# Patient Record
Sex: FEMALE | Race: WHITE | NOT HISPANIC OR LATINO | URBAN - METROPOLITAN AREA
[De-identification: names, ages, dates, MRNs, and addresses within clinical notes are randomized per-mention and may not be internally consistent; named-entity substitution may affect disease eponyms.]

---

## 2024-01-01 ENCOUNTER — HOSPITAL ENCOUNTER (EMERGENCY)
Facility: HOSPITAL | Age: 24
Discharge: STILL ADMITTED OR ADMINISTRATIVE | End: 2024-01-03
Payer: MEDICAID

## 2024-01-01 ENCOUNTER — HOSPITAL ENCOUNTER (OUTPATIENT)
Facility: HOSPITAL | Age: 24
Setting detail: OBSERVATION
Discharge: HOME | End: 2024-01-01
Attending: OBSTETRICS & GYNECOLOGY | Admitting: OBSTETRICS & GYNECOLOGY
Payer: MEDICAID

## 2024-01-01 VITALS
BODY MASS INDEX: 25.76 KG/M2 | DIASTOLIC BLOOD PRESSURE: 71 MMHG | HEIGHT: 62 IN | RESPIRATION RATE: 18 BRPM | TEMPERATURE: 97.7 F | WEIGHT: 140 LBS | SYSTOLIC BLOOD PRESSURE: 107 MMHG | HEART RATE: 64 BPM | OXYGEN SATURATION: 99 %

## 2024-01-01 DIAGNOSIS — K52.9 GASTROENTERITIS: Primary | ICD-10-CM

## 2024-01-01 PROCEDURE — 96366 THER/PROPH/DIAG IV INF ADDON: CPT | Mod: 59

## 2024-01-01 PROCEDURE — 59025 FETAL NON-STRESS TEST: CPT | Performed by: OBSTETRICS & GYNECOLOGY

## 2024-01-01 PROCEDURE — G0378 HOSPITAL OBSERVATION PER HR: HCPCS

## 2024-01-01 PROCEDURE — 99213 OFFICE O/P EST LOW 20 MIN: CPT | Performed by: OBSTETRICS & GYNECOLOGY

## 2024-01-01 PROCEDURE — 94760 N-INVAS EAR/PLS OXIMETRY 1: CPT

## 2024-01-01 PROCEDURE — 96365 THER/PROPH/DIAG IV INF INIT: CPT

## 2024-01-01 PROCEDURE — 96375 TX/PRO/DX INJ NEW DRUG ADDON: CPT

## 2024-01-01 PROCEDURE — G0379 DIRECT REFER HOSPITAL OBSERV: HCPCS

## 2024-01-01 PROCEDURE — 96361 HYDRATE IV INFUSION ADD-ON: CPT | Mod: 59

## 2024-01-01 PROCEDURE — 4500999001 HC ED NO CHARGE

## 2024-01-01 PROCEDURE — 2500000004 HC RX 250 GENERAL PHARMACY W/ HCPCS (ALT 636 FOR OP/ED): Performed by: OBSTETRICS & GYNECOLOGY

## 2024-01-01 PROCEDURE — 2500000005 HC RX 250 GENERAL PHARMACY W/O HCPCS: Performed by: OBSTETRICS & GYNECOLOGY

## 2024-01-01 RX ORDER — SODIUM CHLORIDE, SODIUM LACTATE, POTASSIUM CHLORIDE, CALCIUM CHLORIDE 600; 310; 30; 20 MG/100ML; MG/100ML; MG/100ML; MG/100ML
300 INJECTION, SOLUTION INTRAVENOUS CONTINUOUS
Status: DISCONTINUED | OUTPATIENT
Start: 2024-01-01 | End: 2024-01-01 | Stop reason: HOSPADM

## 2024-01-01 RX ORDER — ONDANSETRON HYDROCHLORIDE 2 MG/ML
4 INJECTION, SOLUTION INTRAVENOUS EVERY 4 HOURS PRN
Status: DISCONTINUED | OUTPATIENT
Start: 2024-01-01 | End: 2024-01-01 | Stop reason: HOSPADM

## 2024-01-01 RX ORDER — ONDANSETRON 4 MG/1
4 TABLET, FILM COATED ORAL 2 TIMES DAILY PRN
Qty: 20 TABLET | Refills: 5 | Status: SHIPPED | OUTPATIENT
Start: 2024-01-01

## 2024-01-01 RX ADMIN — ONDANSETRON 4 MG: 2 INJECTION INTRAMUSCULAR; INTRAVENOUS at 13:15

## 2024-01-01 RX ADMIN — ASCORBIC ACID, VITAMIN A PALMITATE, CHOLECALCIFEROL, THIAMINE HYDROCHLORIDE, RIBOFLAVIN-5 PHOSPHATE SODIUM, PYRIDOXINE HYDROCHLORIDE, NIACINAMIDE, DEXPANTHENOL, ALPHA-TOCOPHEROL ACETATE, VITAMIN K1, FOLIC ACID, BIOTIN, CYANOCOBALAMIN: 200; 3300; 200; 6; 3.6; 6; 40; 15; 10; 150; 600; 60; 5 INJECTION, SOLUTION INTRAVENOUS at 14:02

## 2024-01-01 RX ADMIN — SODIUM CHLORIDE, POTASSIUM CHLORIDE, SODIUM LACTATE AND CALCIUM CHLORIDE 300 ML/HR: 600; 310; 30; 20 INJECTION, SOLUTION INTRAVENOUS at 12:35

## 2024-01-01 ASSESSMENT — COGNITIVE AND FUNCTIONAL STATUS - GENERAL
MOBILITY SCORE: 24
DAILY ACTIVITIY SCORE: 24

## 2024-01-01 ASSESSMENT — PAIN SCALES - GENERAL: PAINLEVEL_OUTOF10: 2

## 2024-01-01 ASSESSMENT — PAIN - FUNCTIONAL ASSESSMENT: PAIN_FUNCTIONAL_ASSESSMENT: 0-10

## 2024-01-01 NOTE — CARE PLAN
The patient's goals for the shift include less nausea     The clinical goals for the shift include  hydration; decreased nausea

## 2024-01-01 NOTE — DISCHARGE SUMMARY
Discharge Summary    Admission Date: 1/1/2024  Discharge Date: 1/1    Discharge Diagnosis  Gastroenteritis    Hospital Course  Delivery Date: This patient has no babies on file. This patient has no babies on file.  Delivery type: This patient has no babies on file.   GA at delivery: 31w5d  Outcome: This patient has no babies on file.  Anesthesia during delivery: This patient has no babies on file.  Intrapartum complications: This patient has no babies on file.  Feeding method:       Procedures: none  Contraception at discharge: none      Pertinent Physical Exam At Time of Discharge    Well  hydrated    Discharge Meds     Your medication list        START taking these medications        Instructions Last Dose Given Next Dose Due   ondansetron 4 mg tablet  Commonly known as: Zofran      Take 1 tablet (4 mg) by mouth 2 times a day as needed for nausea.                 Where to Get Your Medications        These medications were sent to [facility] [address] [place] [address] ([place]), [place] - [address] [facility]  [address] ([place]) [place] [address]      Phone: [phone]   ondansetron 4 mg tablet          Complications Requiring Follow-Up  Rv  office   this   week      Test Results Pending At Discharge  Pending Labs       No current pending labs.            Outpatient Follow-Up  No future appointments.    I spent 10 minutes in the professional and overall care of this patient.      Alexander Helton MD
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
## 2024-01-01 NOTE — PROGRESS NOTES
Feels   better  s/p   2  liters   ivf   and   wants   to  go   home   ,   will   give   zofran  and   follow  up   this week  with her   ob

## 2024-01-02 ENCOUNTER — HOSPITAL ENCOUNTER (OUTPATIENT)
Facility: HOSPITAL | Age: 24
Discharge: HOME | End: 2024-01-03
Attending: OBSTETRICS & GYNECOLOGY | Admitting: OBSTETRICS & GYNECOLOGY
Payer: MEDICAID

## 2024-01-02 DIAGNOSIS — R11.2 NAUSEA AND VOMITING, UNSPECIFIED VOMITING TYPE: Primary | ICD-10-CM

## 2024-01-02 LAB
ALBUMIN SERPL BCP-MCNC: 3.5 G/DL (ref 3.4–5)
ALP SERPL-CCNC: 93 U/L (ref 33–110)
ALT SERPL W P-5'-P-CCNC: 8 U/L (ref 7–45)
AMYLASE SERPL-CCNC: 38 U/L (ref 29–103)
ANION GAP SERPL CALC-SCNC: 19 MMOL/L (ref 10–20)
AST SERPL W P-5'-P-CCNC: 14 U/L (ref 9–39)
BILIRUB SERPL-MCNC: 1.4 MG/DL (ref 0–1.2)
BUN SERPL-MCNC: 5 MG/DL (ref 6–23)
CALCIUM SERPL-MCNC: 8.8 MG/DL (ref 8.6–10.3)
CHLORIDE SERPL-SCNC: 99 MMOL/L (ref 98–107)
CO2 SERPL-SCNC: 23 MMOL/L (ref 21–32)
CREAT SERPL-MCNC: 0.56 MG/DL (ref 0.5–1.05)
ERYTHROCYTE [DISTWIDTH] IN BLOOD BY AUTOMATED COUNT: 12.8 % (ref 11.5–14.5)
GFR SERPL CREATININE-BSD FRML MDRD: >90 ML/MIN/1.73M*2
GLUCOSE SERPL-MCNC: 95 MG/DL (ref 74–99)
HCT VFR BLD AUTO: 37.8 % (ref 36–46)
HGB BLD-MCNC: 12.7 G/DL (ref 12–16)
LIPASE SERPL-CCNC: 15 U/L (ref 9–82)
MCH RBC QN AUTO: 29.7 PG (ref 26–34)
MCHC RBC AUTO-ENTMCNC: 33.6 G/DL (ref 32–36)
MCV RBC AUTO: 88 FL (ref 80–100)
NRBC BLD-RTO: 0 /100 WBCS (ref 0–0)
PLATELET # BLD AUTO: 226 X10*3/UL (ref 150–450)
POTASSIUM SERPL-SCNC: 3 MMOL/L (ref 3.5–5.3)
PROT SERPL-MCNC: 6.3 G/DL (ref 6.4–8.2)
RBC # BLD AUTO: 4.28 X10*6/UL (ref 4–5.2)
SODIUM SERPL-SCNC: 138 MMOL/L (ref 136–145)
WBC # BLD AUTO: 14.7 X10*3/UL (ref 4.4–11.3)

## 2024-01-02 PROCEDURE — 2500000004 HC RX 250 GENERAL PHARMACY W/ HCPCS (ALT 636 FOR OP/ED): Performed by: OBSTETRICS & GYNECOLOGY

## 2024-01-02 PROCEDURE — 36415 COLL VENOUS BLD VENIPUNCTURE: CPT | Performed by: OBSTETRICS & GYNECOLOGY

## 2024-01-02 PROCEDURE — 82150 ASSAY OF AMYLASE: CPT | Performed by: OBSTETRICS & GYNECOLOGY

## 2024-01-02 PROCEDURE — 83690 ASSAY OF LIPASE: CPT | Performed by: OBSTETRICS & GYNECOLOGY

## 2024-01-02 PROCEDURE — 84075 ASSAY ALKALINE PHOSPHATASE: CPT | Performed by: OBSTETRICS & GYNECOLOGY

## 2024-01-02 PROCEDURE — G0378 HOSPITAL OBSERVATION PER HR: HCPCS

## 2024-01-02 PROCEDURE — 85027 COMPLETE CBC AUTOMATED: CPT | Performed by: OBSTETRICS & GYNECOLOGY

## 2024-01-02 PROCEDURE — G0379 DIRECT REFER HOSPITAL OBSERV: HCPCS

## 2024-01-02 RX ORDER — FAMOTIDINE 10 MG/ML
40 INJECTION INTRAVENOUS ONCE
Status: COMPLETED | OUTPATIENT
Start: 2024-01-02 | End: 2024-01-02

## 2024-01-02 RX ORDER — ONDANSETRON HYDROCHLORIDE 2 MG/ML
8 INJECTION, SOLUTION INTRAVENOUS ONCE
Status: COMPLETED | OUTPATIENT
Start: 2024-01-02 | End: 2024-01-02

## 2024-01-02 RX ORDER — DEXTROSE, SODIUM CHLORIDE, SODIUM LACTATE, POTASSIUM CHLORIDE, AND CALCIUM CHLORIDE 5; .6; .31; .03; .02 G/100ML; G/100ML; G/100ML; G/100ML; G/100ML
125 INJECTION, SOLUTION INTRAVENOUS CONTINUOUS
Status: DISCONTINUED | OUTPATIENT
Start: 2024-01-02 | End: 2024-01-03 | Stop reason: HOSPADM

## 2024-01-02 RX ADMIN — ONDANSETRON 8 MG: 2 INJECTION INTRAMUSCULAR; INTRAVENOUS at 23:19

## 2024-01-02 RX ADMIN — FAMOTIDINE 40 MG: 10 INJECTION, SOLUTION INTRAVENOUS at 23:26

## 2024-01-02 RX ADMIN — SODIUM CHLORIDE, SODIUM LACTATE, POTASSIUM CHLORIDE, CALCIUM CHLORIDE AND DEXTROSE MONOHYDRATE 125 ML/HR: 5; 600; 310; 30; 20 INJECTION, SOLUTION INTRAVENOUS at 23:16

## 2024-01-02 ASSESSMENT — PAIN SCALES - GENERAL: PAINLEVEL_OUTOF10: 2

## 2024-01-03 ENCOUNTER — HOSPITAL ENCOUNTER (OUTPATIENT)
Facility: HOSPITAL | Age: 24
Setting detail: OBSERVATION
End: 2024-01-03
Attending: OBSTETRICS & GYNECOLOGY | Admitting: OBSTETRICS & GYNECOLOGY
Payer: MEDICAID

## 2024-01-03 VITALS
TEMPERATURE: 99.1 F | RESPIRATION RATE: 16 BRPM | DIASTOLIC BLOOD PRESSURE: 62 MMHG | SYSTOLIC BLOOD PRESSURE: 104 MMHG | BODY MASS INDEX: 25.6 KG/M2 | HEART RATE: 73 BPM | HEIGHT: 62 IN

## 2024-01-03 PROBLEM — R11.2 NAUSEA AND VOMITING: Status: ACTIVE | Noted: 2024-01-03

## 2024-01-03 PROBLEM — O21.9 NAUSEA AND VOMITING IN PREGNANCY PRIOR TO 22 WEEKS GESTATION (HHS-HCC): Status: ACTIVE | Noted: 2024-01-03

## 2024-01-03 PROBLEM — E87.6 HYPOKALEMIA: Status: ACTIVE | Noted: 2024-01-03

## 2024-01-03 PROCEDURE — G0378 HOSPITAL OBSERVATION PER HR: HCPCS

## 2024-01-03 PROCEDURE — 2500000004 HC RX 250 GENERAL PHARMACY W/ HCPCS (ALT 636 FOR OP/ED): Performed by: OBSTETRICS & GYNECOLOGY

## 2024-01-03 PROCEDURE — 59020 FETAL CONTRACT STRESS TEST: CPT

## 2024-01-03 PROCEDURE — 99214 OFFICE O/P EST MOD 30 MIN: CPT | Performed by: OBSTETRICS & GYNECOLOGY

## 2024-01-03 RX ORDER — ONDANSETRON HYDROCHLORIDE 2 MG/ML
8 INJECTION, SOLUTION INTRAVENOUS ONCE
Status: COMPLETED | OUTPATIENT
Start: 2024-01-03 | End: 2024-01-03

## 2024-01-03 RX ORDER — PROMETHAZINE HYDROCHLORIDE 12.5 MG/1
12.5 SUPPOSITORY RECTAL EVERY 8 HOURS
Qty: 12 EACH | Refills: 1 | Status: SHIPPED | OUTPATIENT
Start: 2024-01-03

## 2024-01-03 RX ORDER — MAGNESIUM HYDROXIDE 2400 MG/10ML
10 SUSPENSION ORAL DAILY PRN
Status: DISCONTINUED | OUTPATIENT
Start: 2024-01-03 | End: 2024-01-03 | Stop reason: HOSPADM

## 2024-01-03 RX ORDER — POTASSIUM CHLORIDE 20 MEQ/1
20 TABLET, EXTENDED RELEASE ORAL ONCE
Status: COMPLETED | OUTPATIENT
Start: 2024-01-03 | End: 2024-01-03

## 2024-01-03 RX ORDER — ONDANSETRON 8 MG/1
8 TABLET, ORALLY DISINTEGRATING ORAL EVERY 8 HOURS PRN
Qty: 20 TABLET | Refills: 0 | Status: SHIPPED | OUTPATIENT
Start: 2024-01-03

## 2024-01-03 RX ADMIN — PROMETHAZINE HYDROCHLORIDE 12.5 MG: 25 INJECTION INTRAMUSCULAR; INTRAVENOUS at 02:50

## 2024-01-03 RX ADMIN — ONDANSETRON 8 MG: 2 INJECTION INTRAMUSCULAR; INTRAVENOUS at 05:25

## 2024-01-03 RX ADMIN — POTASSIUM CHLORIDE 20 MEQ: 1500 TABLET, EXTENDED RELEASE ORAL at 06:43

## 2024-01-03 RX ADMIN — SODIUM CHLORIDE, SODIUM LACTATE, POTASSIUM CHLORIDE, CALCIUM CHLORIDE AND DEXTROSE MONOHYDRATE 125 ML/HR: 5; 600; 310; 30; 20 INJECTION, SOLUTION INTRAVENOUS at 02:49

## 2024-01-03 NOTE — PROGRESS NOTES
Antepartum progress note    Assessment/Plan   Cassie Epstein is a 23 y.o.,  at 32w0d gestation readmitted with nausea and vomiting.  Discharge to home.  Rx for phenergan rectal suppositories and Zofran ODT sent to pharmacy.  Follow up with primary OB within next week.     Addendum:   Pts partner now here. Pt anxious about going home, constipated, having heartburn.  Will try oral dose of milk of magnesia for heartburn and constipation.   Pt to look at menu and see if something sounds palatable.   Hold discharge pending oral intake.    Subjective     Pt reports feeling better this am in the last hour. Has tried pretzels, sips of water. Was able to keep down potassium.   Wants to go home.     Objective     Recent /62    BP Min/Max Last 24 Hours:  BP  Min: 104/62  Max: 122/78    Physical Exam:  General: Examination reveals a well developed, well nourished, female, in no acute distress. She is alert and cooperative.  Abdomen: soft, non tender, non distended.    Lab Data:  Lab Results   Component Value Date    WBC 14.7 (H) 2024    HGB 12.7 2024    HCT 37.8 2024     2024     Lab Results   Component Value Date    GLUCOSE 95 2024     2024    K 3.0 (L) 2024    CL 99 2024    CO2 23 2024    ANIONGAP 19 2024    BUN 5 (L) 2024    CREATININE 0.56 2024    EGFR >90 2024    CALCIUM 8.8 2024    ALBUMIN 3.5 2024    PROT 6.3 (L) 2024    ALKPHOS 93 2024    ALT 8 2024    AST 14 2024    BILITOT 1.4 (H) 2024         Roopa Michael MD

## 2024-01-03 NOTE — DISCHARGE SUMMARY
Discharge Summary    Admission Date: 1/2/2024  Discharge Date: 1/4/24    Discharge Diagnosis  Nausea and vomiting    Hospital Course  Patient presented for intractable nausea/vomiting at 32 weeks. She was hypokalemic and had 2+ ketones on UAS. She received D5LR IVF, Zofran, Phenergan, and Potassium replacement with improvement. She was discharged home with plan for short interval follow up with regular OB and with Rx for Zofran ODT 8 mg and Phenergan rectal suppositories   Procedures: none  Contraception at discharge: to be discussed postpartum      Pertinent Physical Exam At Time of Discharge  GENERAL: Examination reveals a well developed, well nourished, gravid female in no acute distress. She is alert and cooperative.  LUNGS:  appropriate effort  ABDOMEN: soft, gravid, nontender, nondistended, no abnormal masses, no epigastric pain  FHR is 140 , with Accelerations, and a Cat I  tracing.    [place] reading:  irritability  EXTREMITIES: no redness or tenderness in the calves or thighs, no edema  PSYCHOLOGICAL: awake and alert; oriented to person, place, and time      Discharge Meds     Your medication list        START taking these medications        Instructions Last Dose Given Next Dose Due   ondansetron ODT 8 mg disintegrating tablet  Commonly known as: Zofran-ODT      Take 1 tablet (8 mg) by mouth every 8 hours if needed for nausea or vomiting.       promethazine 12.5 mg suppository  Commonly known as: Phenergan      Insert 1 suppository (12.5 mg) into the rectum every 8 hours.              ASK your doctor about these medications        Instructions Last Dose Given Next Dose Due   ondansetron 4 mg tablet  Commonly known as: Zofran      Take 1 tablet (4 mg) by mouth 2 times a day as needed for nausea.                 Where to Get Your Medications        These medications were sent to [facility] [address] [place] [address]), [place] - [address] [facility]  [address] [facility] ([place]) [place] [address]      Phone:  [phone]   ondansetron ODT 8 mg disintegrating tablet  promethazine 12.5 mg suppository          Complications Requiring Follow-Up  Nausea and vomiting    Test Results Pending At Discharge  Pending Labs       No current pending labs.            Outpatient Follow-Up  Return to regular OB provider within 1 week    I spent 45 minutes in the professional and overall care of this patient.      Zoya Olivarez, DO

## 2024-01-03 NOTE — H&P
Obstetrical Admission History and Physical     Cassie Epstein is a 23 y.o. . She presents for nausea and vomiting    Chief Complaint: Nausea/Vomiting In Pregnancy    Assessment/Plan    Discussed findings on exam  Patient received D5LR IVF resuscitation  Zofran and Phenergan given on L&D with improvement  Hypokalemic- potassium replaced prior to discharge  Rx for Zofran ODT 8 mg and Phenergan rectal suppositories sent to pharmacy  Patient overall improved, will discharge home with planned short interval follow up with regular OB provider    Principal Problem:    Nausea and vomiting  Active Problems:    Hypokalemia      Pregnancy Problems (from 23 to present)       Problem Noted Resolved    Nausea and vomiting 1/3/2024 by Zoya Olivarez DO No    Priority:  Medium      Overview Signed 1/3/2024  6:05 AM by Zoya Olivarez DO     Seen  and 1/3- for intractable nausea and vomiting  Received D5LR, Zofran, and Phenergan with improvement on 1/3  Plan for short interval follow up with regular OB  Precautions given, Rx for Zofran ODT 8 mg and Phenergan rectal suppositories sent to pharmacy               Danilo Matthews is here complaining of nausea and vomiting. She reports onset of acute severe nausea and vomiting after eating a sauce that tasted funny. She states this triggered a vicious cycle of nausea and vomiting that has not resolved. She was seen on L&D  and improved temporarily with IV antiemetics and IVF. She was given a prescription for Zofran but was unable to keep it down when her nausea recurred after discharge. She states she cannot keep down food or water and continues to vomit even when she does not PO challenge. Good fetal movement. Denies vaginal bleeding., Denies contractions., Denies leaking of fluid.           Obstetrical History   OB History    Para Term  AB Living   3 2 2     2   SAB IAB Ectopic Multiple Live Births           2      # Outcome Date GA Lbr  Bradley/2nd Weight Sex Delivery Anes PTL Lv   3 Current            2 Term 02/26/21    F Vag-Spont   [place]   1 Term 11/02/18    F Vag-Spont   [place]ON       Past Medical History  Past Medical History:   Diagnosis Date    Previous back surgery     pins rods in back    Unspecified fracture of unspecified lumbar vertebra, initial encounter for closed fracture (CMS/[facility]) 06/25/2019    Fracture of lumbar spine        Past Surgical History   Past Surgical History:   Procedure Laterality Date    OTHER SURGICAL HISTORY  05/09/2019    Spinal surgery       Social History  Social History     Tobacco Use    Smoking status: Never    Smokeless tobacco: Never   Substance Use Topics    Alcohol use: Not Currently     Substance and Sexual Activity   Drug Use Never       Allergies  Patient has no known allergies.     Medications  Medications Prior to Admission   Medication Sig Dispense Refill Last Dose    ondansetron (Zofran) 4 mg tablet Take 1 tablet (4 mg) by mouth 2 times a day as needed for nausea. 20 tablet 5        Objective    Last Vitals  Temp Pulse Resp BP MAP O2 Sat   36.8 °C (98.2 °F) 65 18 122/78         Physical Examination  GENERAL: Examination reveals a well developed, well nourished, gravid female in no acute distress. She is alert and cooperative.  LUNGS:  appropriate effort  HEART: regular rate and rhythm, S1, S2 normal, no murmur, click, rub or gallop  ABDOMEN: soft, gravid, nontender, nondistended, no abnormal masses, no epigastric pain  FHR is 140 , with Accelerations, and a Cat I  tracing.    [place] reading:  Irritability  EXTREMITIES: no redness or tenderness in the calves or thighs, no edema  PSYCHOLOGICAL: awake and alert; oriented to person, place, and time    Lab Review  Lab Results   Component Value Date    WBC 14.7 (H) 01/02/2024    HGB 12.7 01/02/2024    HCT 37.8 01/02/2024     01/02/2024     Lab Results   Component Value Date    GLUCOSE 95 01/02/2024     01/02/2024    K 3.0 (L) 01/02/2024    CL 99  01/02/2024    CO2 23 01/02/2024    ANIONGAP 19 01/02/2024    BUN 5 (L) 01/02/2024    CREATININE 0.56 01/02/2024    EGFR >90 01/02/2024    CALCIUM 8.8 01/02/2024    ALBUMIN 3.5 01/02/2024    PROT 6.3 (L) 01/02/2024    ALKPHOS 93 01/02/2024    ALT 8 01/02/2024    AST 14 01/02/2024    BILITOT 1.4 (H) 01/02/2024     Zoya Olivarez, DO

## 2024-01-03 NOTE — DISCHARGE INSTRUCTIONS
OB Triage Discharge    Return to your regular OB provider's office within 1 week of discharge for follow up    Please keep all upcoming scheduled appointments  If you do not have a scheduled appointment for routine care, please call the office to schedule one in the next 1-2 weeks    Call the office immediately for any of the following symptoms:  Painful contractions, regular contractions that are worsening over time, or any new/persistent pelvic or abdominal pain  Leaking amniotic fluid  Vaginal bleeding  Decreased or absent fetal movement

## 2024-01-03 NOTE — NURSING NOTE
0020 pt resting, n/v slowed per pt. IVF still running as ordered. Pt encouraged to rest. Pt states she feels like the medications are helping.

## 2024-01-03 NOTE — NURSING NOTE
"Pt arrived from ED c/o N/V . +FM, -contx, -LOF, -VB, prenatal care in [place]. Was seen here yesterday on the floor for \"same thing. Felt better after fluids but began again this evening.\" EFM placed.   "

## 2024-01-08 ENCOUNTER — HOSPITAL ENCOUNTER (OUTPATIENT)
Age: 24
Discharge: HOME | End: 2024-01-08

## 2024-01-24 ENCOUNTER — HOSPITAL ENCOUNTER (OUTPATIENT)
Age: 24
Discharge: HOME | End: 2024-01-24
Payer: MEDICAID

## 2024-01-24 LAB
ERYTHROCYTE [DISTWIDTH] IN BLOOD BY AUTOMATED COUNT: 13.1 % (ref 11.5–15)
HCT VFR BLD AUTO: 37 % (ref 34–48)
HGB BLD-MCNC: 12 G/DL (ref 11.5–15.5)
MCH RBC QN AUTO: 29.1 PG (ref 26–35)
MCHC RBC AUTO-ENTMCNC: 32.4 G/DL (ref 32–34.5)
MCV RBC AUTO: 89.6 FL (ref 80–99.9)
PLATELET # BLD AUTO: 185 K/UL (ref 130–450)
PMV BLD AUTO: 10.9 FL (ref 7–12)
RBC # BLD AUTO: 4.13 M/UL (ref 3.5–5.5)
WBC OTHER # BLD: 8.6 K/UL (ref 4.5–11.5)

## 2024-01-24 PROCEDURE — 96372 THER/PROPH/DIAG INJ SC/IM: CPT

## 2024-01-24 PROCEDURE — 86762 RUBELLA ANTIBODY: CPT

## 2024-01-24 PROCEDURE — 86592 SYPHILIS TEST NON-TREP QUAL: CPT

## 2024-01-24 PROCEDURE — 6360000002 HC RX W HCPCS: Performed by: OBSTETRICS & GYNECOLOGY

## 2024-01-24 PROCEDURE — 86850 RBC ANTIBODY SCREEN: CPT

## 2024-01-24 PROCEDURE — 86900 BLOOD TYPING SEROLOGIC ABO: CPT

## 2024-01-24 PROCEDURE — 86901 BLOOD TYPING SEROLOGIC RH(D): CPT

## 2024-01-24 PROCEDURE — 85027 COMPLETE CBC AUTOMATED: CPT

## 2024-01-24 PROCEDURE — 87340 HEPATITIS B SURFACE AG IA: CPT

## 2024-01-24 PROCEDURE — 36415 COLL VENOUS BLD VENIPUNCTURE: CPT

## 2024-01-24 RX ADMIN — HUMAN RHO(D) IMMUNE GLOBULIN 300 MCG: 1500 SOLUTION INTRAMUSCULAR; INTRAVENOUS at 16:30

## 2024-01-25 LAB
HBV SURFACE AG SERPL QL IA: NONREACTIVE
RPR SER QL: NONREACTIVE
RUBV IGG SERPL QL IA: NORMAL IU/ML

## 2024-01-26 LAB
ABO + RH BLD: NORMAL
BLOOD GROUP ANTIBODIES SERPL: NEGATIVE
COMPONENT: NORMAL
COMPONENT: NORMAL
HISTORY CHECK: NORMAL
Lab: 1
STATUS OF UNITS: NORMAL
TRANSFUSION STATUS: NORMAL
UNIT DIVISION: 0
UNIT NUMBER: NORMAL

## 2024-01-28 ENCOUNTER — HOSPITAL ENCOUNTER (OUTPATIENT)
Age: 24
Discharge: HOME | End: 2024-01-28
Attending: OBSTETRICS & GYNECOLOGY | Admitting: OBSTETRICS & GYNECOLOGY
Payer: MEDICAID

## 2024-01-28 VITALS — RESPIRATION RATE: 16 BRPM | DIASTOLIC BLOOD PRESSURE: 58 MMHG | HEART RATE: 92 BPM | SYSTOLIC BLOOD PRESSURE: 117 MMHG

## 2024-01-28 PROBLEM — O26.90 PREGNANCY RELATED CONDITION, ANTEPARTUM: Status: ACTIVE | Noted: 2024-01-28

## 2024-01-28 PROBLEM — O42.90 LEAKAGE OF AMNIOTIC FLUID: Status: ACTIVE | Noted: 2024-01-28

## 2024-01-28 LAB
AMNISURE, POC: NEGATIVE
BACTERIA URNS QL MICRO: ABNORMAL
BILIRUB UR QL STRIP: NEGATIVE
CLARITY UR: CLEAR
COLOR UR: YELLOW
GLUCOSE UR STRIP-MCNC: NEGATIVE MG/DL
HGB UR QL STRIP.AUTO: NEGATIVE
KETONES UR STRIP-MCNC: NEGATIVE MG/DL
LEUKOCYTE ESTERASE UR QL STRIP: NEGATIVE
Lab: NORMAL
NEGATIVE QC PASS/FAIL: NORMAL
NITRITE UR QL STRIP: NEGATIVE
PH UR STRIP: 7 [PH] (ref 5–9)
POSITIVE QC PASS/FAIL: NORMAL
PROT UR STRIP-MCNC: NEGATIVE MG/DL
RBC #/AREA URNS HPF: ABNORMAL /HPF
SP GR UR STRIP: 1.01 (ref 1–1.03)
UROBILINOGEN UR STRIP-ACNC: 0.2 EU/DL (ref 0–1)
WBC #/AREA URNS HPF: ABNORMAL /HPF

## 2024-01-28 PROCEDURE — 81001 URINALYSIS AUTO W/SCOPE: CPT

## 2024-01-28 PROCEDURE — 87081 CULTURE SCREEN ONLY: CPT

## 2024-01-28 PROCEDURE — 86403 PARTICLE AGGLUT ANTBDY SCRN: CPT

## 2024-01-28 PROCEDURE — 84112 EVAL AMNIOTIC FLUID PROTEIN: CPT

## 2024-01-28 PROCEDURE — 99202 OFFICE O/P NEW SF 15 MIN: CPT

## 2024-01-29 NOTE — PROGRESS NOTES
Dr nance notified of patient arrival and complaint. Notified of reactive . Rare uterine contractions on the monitor. Amnisure negative. SVE 1/ thick and posterior

## 2024-01-29 NOTE — PROGRESS NOTES
35.4 weeks arrived ambulatory to L&D complaining of contractions and leaking of fluid. Denies vaginal bleeding. Perceived fetal movement by patient. Oriented to triage 1

## 2024-01-29 NOTE — DISCHARGE INSTRUCTIONS
Home Undelivered Discharge Instructions    After Discharge Orders:    Keep next scheduled appointment                    Diet:  normal diet as tolerated    Rest: normal activity as tolerated    Other instructions: Do kick counts once a day on your baby. Choose the time of day your baby is most active. Get in a comfortable lying or sitting position and time how long it takes to feel 10 kicks, twists, turns, swishes, or rolls. Call your physician or midwife if there have not been 10 kicks in 1.5 hours    Call physician or midwife, return to Labor and Delivery, call 911, or go to the nearest Emergency Room if: increased leakage or fluid, contractions more than  10 per  1.5 hours, decreased fetal movement, or bleeding from vaginal area

## 2024-01-31 LAB
MICROORGANISM SPEC CULT: ABNORMAL
SPECIMEN DESCRIPTION: ABNORMAL

## 2024-02-16 ENCOUNTER — HOSPITAL ENCOUNTER (INPATIENT)
Age: 24
LOS: 2 days | Discharge: HOME | DRG: 560 | End: 2024-02-18
Attending: OBSTETRICS & GYNECOLOGY | Admitting: OBSTETRICS & GYNECOLOGY
Payer: MEDICAID

## 2024-02-16 PROBLEM — Z3A.38 38 WEEKS GESTATION OF PREGNANCY: Status: ACTIVE | Noted: 2024-02-16

## 2024-02-16 PROBLEM — Z78.9 ADMITTED TO LABOR AND DELIVERY: Status: RESOLVED | Noted: 2024-02-16 | Resolved: 2024-02-16

## 2024-02-16 PROBLEM — O26.90 PREGNANCY RELATED CONDITION, ANTEPARTUM: Status: RESOLVED | Noted: 2024-01-28 | Resolved: 2024-02-16

## 2024-02-16 PROBLEM — Z78.9 ADMITTED TO LABOR AND DELIVERY: Status: ACTIVE | Noted: 2024-02-16

## 2024-02-16 LAB
ABO + RH BLD: NORMAL
ALBUMIN SERPL-MCNC: 3.2 G/DL (ref 3.5–5.2)
ALP SERPL-CCNC: 172 U/L (ref 35–104)
ALT SERPL-CCNC: 8 U/L (ref 0–32)
AMPHET UR QL SCN: NEGATIVE
ANION GAP SERPL CALCULATED.3IONS-SCNC: 13 MMOL/L (ref 7–16)
ANTIBODY IDENTIFICATION: NORMAL
ARM BAND NUMBER: NORMAL
AST SERPL-CCNC: 15 U/L (ref 0–31)
BARBITURATES UR QL SCN: NEGATIVE
BENZODIAZ UR QL: NEGATIVE
BILIRUB SERPL-MCNC: 0.6 MG/DL (ref 0–1.2)
BLOOD BANK SAMPLE EXPIRATION: NORMAL
BLOOD GROUP ANTIBODIES SERPL: POSITIVE
BUN SERPL-MCNC: 4 MG/DL (ref 6–20)
BUPRENORPHINE UR QL: NEGATIVE
CALCIUM SERPL-MCNC: 8.7 MG/DL (ref 8.6–10.2)
CANNABINOIDS UR QL SCN: POSITIVE
CHLORIDE SERPL-SCNC: 105 MMOL/L (ref 98–107)
CO2 SERPL-SCNC: 19 MMOL/L (ref 22–29)
COCAINE UR QL SCN: NEGATIVE
CREAT SERPL-MCNC: 0.6 MG/DL (ref 0.5–1)
DAT POLY-SP REAG RBC QL: NEGATIVE
ERYTHROCYTE [DISTWIDTH] IN BLOOD BY AUTOMATED COUNT: 13.3 % (ref 11.5–15)
FENTANYL UR QL: NEGATIVE
GFR SERPL CREATININE-BSD FRML MDRD: >60 ML/MIN/1.73M2
GLUCOSE SERPL-MCNC: 104 MG/DL (ref 74–99)
HCT VFR BLD AUTO: 35.9 % (ref 34–48)
HGB BLD-MCNC: 11.9 G/DL (ref 11.5–15.5)
HIV1+2 AB SERPLBLD QL IA.RAPID: NONREACTIVE
MCH RBC QN AUTO: 28.9 PG (ref 26–35)
MCHC RBC AUTO-ENTMCNC: 33.1 G/DL (ref 32–34.5)
MCV RBC AUTO: 87.1 FL (ref 80–99.9)
METHADONE UR QL: NEGATIVE
OPIATES UR QL SCN: NEGATIVE
OXYCODONE UR QL SCN: NEGATIVE
PCP UR QL SCN: NEGATIVE
PLATELET # BLD AUTO: 200 K/UL (ref 130–450)
PMV BLD AUTO: 12.6 FL (ref 7–12)
POTASSIUM SERPL-SCNC: 3.6 MMOL/L (ref 3.5–5)
PROT SERPL-MCNC: 5.9 G/DL (ref 6.4–8.3)
RBC # BLD AUTO: 4.12 M/UL (ref 3.5–5.5)
SODIUM SERPL-SCNC: 137 MMOL/L (ref 132–146)
TEST INFORMATION: ABNORMAL
WBC OTHER # BLD: 9.6 K/UL (ref 4.5–11.5)

## 2024-02-16 PROCEDURE — 86592 SYPHILIS TEST NON-TREP QUAL: CPT

## 2024-02-16 PROCEDURE — 86870 RBC ANTIBODY IDENTIFICATION: CPT

## 2024-02-16 PROCEDURE — 86850 RBC ANTIBODY SCREEN: CPT

## 2024-02-16 PROCEDURE — 10907ZC DRAINAGE OF AMNIOTIC FLUID, THERAPEUTIC FROM PRODUCTS OF CONCEPTION, VIA NATURAL OR ARTIFICIAL OPENING: ICD-10-PCS | Performed by: OBSTETRICS & GYNECOLOGY

## 2024-02-16 PROCEDURE — 2580000003 HC RX 258: Performed by: OBSTETRICS & GYNECOLOGY

## 2024-02-16 PROCEDURE — 6360000002 HC RX W HCPCS: Performed by: OBSTETRICS & GYNECOLOGY

## 2024-02-16 PROCEDURE — 59025 FETAL NON-STRESS TEST: CPT

## 2024-02-16 PROCEDURE — 1710000000 HC NURSERY LEVEL I R&B

## 2024-02-16 PROCEDURE — G0480 DRUG TEST DEF 1-7 CLASSES: HCPCS

## 2024-02-16 PROCEDURE — 85027 COMPLETE CBC AUTOMATED: CPT

## 2024-02-16 PROCEDURE — 86901 BLOOD TYPING SEROLOGIC RH(D): CPT

## 2024-02-16 PROCEDURE — 86900 BLOOD TYPING SEROLOGIC ABO: CPT

## 2024-02-16 PROCEDURE — 80053 COMPREHEN METABOLIC PANEL: CPT

## 2024-02-16 PROCEDURE — 80307 DRUG TEST PRSMV CHEM ANLYZR: CPT

## 2024-02-16 PROCEDURE — 86880 COOMBS TEST DIRECT: CPT

## 2024-02-16 PROCEDURE — 86701 HIV-1ANTIBODY: CPT

## 2024-02-16 PROCEDURE — 6370000000 HC RX 637 (ALT 250 FOR IP): Performed by: OBSTETRICS & GYNECOLOGY

## 2024-02-16 RX ORDER — MODIFIED LANOLIN
OINTMENT (GRAM) TOPICAL PRN
Status: DISCONTINUED | OUTPATIENT
Start: 2024-02-16 | End: 2024-02-18 | Stop reason: HOSPADM

## 2024-02-16 RX ORDER — ONDANSETRON 4 MG/1
8 TABLET, ORALLY DISINTEGRATING ORAL EVERY 8 HOURS PRN
Status: DISCONTINUED | OUTPATIENT
Start: 2024-02-16 | End: 2024-02-18 | Stop reason: HOSPADM

## 2024-02-16 RX ORDER — SODIUM CHLORIDE 0.9 % (FLUSH) 0.9 %
5-40 SYRINGE (ML) INJECTION EVERY 12 HOURS SCHEDULED
Status: DISCONTINUED | OUTPATIENT
Start: 2024-02-16 | End: 2024-02-16

## 2024-02-16 RX ORDER — DOCUSATE SODIUM 100 MG/1
100 CAPSULE, LIQUID FILLED ORAL 2 TIMES DAILY
Status: DISCONTINUED | OUTPATIENT
Start: 2024-02-16 | End: 2024-02-16

## 2024-02-16 RX ORDER — LIDOCAINE HYDROCHLORIDE 10 MG/ML
INJECTION, SOLUTION INFILTRATION; PERINEURAL
Status: DISCONTINUED
Start: 2024-02-16 | End: 2024-02-16

## 2024-02-16 RX ORDER — SODIUM CHLORIDE 0.9 % (FLUSH) 0.9 %
5-40 SYRINGE (ML) INJECTION PRN
Status: DISCONTINUED | OUTPATIENT
Start: 2024-02-16 | End: 2024-02-16

## 2024-02-16 RX ORDER — ONDANSETRON 2 MG/ML
4 INJECTION INTRAMUSCULAR; INTRAVENOUS EVERY 6 HOURS PRN
Status: DISCONTINUED | OUTPATIENT
Start: 2024-02-16 | End: 2024-02-16

## 2024-02-16 RX ORDER — SODIUM CHLORIDE 0.9 % (FLUSH) 0.9 %
5-40 SYRINGE (ML) INJECTION EVERY 12 HOURS SCHEDULED
Status: DISCONTINUED | OUTPATIENT
Start: 2024-02-16 | End: 2024-02-18 | Stop reason: HOSPADM

## 2024-02-16 RX ORDER — SODIUM CHLORIDE 9 MG/ML
INJECTION, SOLUTION INTRAVENOUS PRN
Status: DISCONTINUED | OUTPATIENT
Start: 2024-02-16 | End: 2024-02-18 | Stop reason: HOSPADM

## 2024-02-16 RX ORDER — SODIUM CHLORIDE, SODIUM LACTATE, POTASSIUM CHLORIDE, CALCIUM CHLORIDE 600; 310; 30; 20 MG/100ML; MG/100ML; MG/100ML; MG/100ML
INJECTION, SOLUTION INTRAVENOUS CONTINUOUS
Status: DISCONTINUED | OUTPATIENT
Start: 2024-02-16 | End: 2024-02-16

## 2024-02-16 RX ORDER — SODIUM CHLORIDE 0.9 % (FLUSH) 0.9 %
5-40 SYRINGE (ML) INJECTION PRN
Status: DISCONTINUED | OUTPATIENT
Start: 2024-02-16 | End: 2024-02-18 | Stop reason: HOSPADM

## 2024-02-16 RX ORDER — CARBOPROST TROMETHAMINE 250 UG/ML
250 INJECTION, SOLUTION INTRAMUSCULAR PRN
Status: DISCONTINUED | OUTPATIENT
Start: 2024-02-16 | End: 2024-02-16

## 2024-02-16 RX ORDER — SODIUM CHLORIDE, SODIUM LACTATE, POTASSIUM CHLORIDE, AND CALCIUM CHLORIDE .6; .31; .03; .02 G/100ML; G/100ML; G/100ML; G/100ML
1000 INJECTION, SOLUTION INTRAVENOUS PRN
Status: DISCONTINUED | OUTPATIENT
Start: 2024-02-16 | End: 2024-02-16

## 2024-02-16 RX ORDER — DOCUSATE SODIUM 100 MG/1
100 CAPSULE, LIQUID FILLED ORAL 2 TIMES DAILY
Status: DISCONTINUED | OUTPATIENT
Start: 2024-02-16 | End: 2024-02-18 | Stop reason: HOSPADM

## 2024-02-16 RX ORDER — LIDOCAINE HYDROCHLORIDE 10 MG/ML
0.8 INJECTION, SOLUTION EPIDURAL; INFILTRATION; INTRACAUDAL; PERINEURAL ONCE
Status: DISCONTINUED | OUTPATIENT
Start: 2024-02-16 | End: 2024-02-16

## 2024-02-16 RX ORDER — IBUPROFEN 800 MG/1
800 TABLET ORAL EVERY 8 HOURS PRN
Status: DISCONTINUED | OUTPATIENT
Start: 2024-02-16 | End: 2024-02-18 | Stop reason: HOSPADM

## 2024-02-16 RX ORDER — MISOPROSTOL 200 UG/1
800 TABLET ORAL PRN
Status: DISCONTINUED | OUTPATIENT
Start: 2024-02-16 | End: 2024-02-16

## 2024-02-16 RX ORDER — METHYLERGONOVINE MALEATE 0.2 MG/ML
200 INJECTION INTRAVENOUS PRN
Status: DISCONTINUED | OUTPATIENT
Start: 2024-02-16 | End: 2024-02-16

## 2024-02-16 RX ORDER — PETROLATUM,WHITE/LANOLIN
OINTMENT (GRAM) TOPICAL PRN
Status: DISCONTINUED | OUTPATIENT
Start: 2024-02-17 | End: 2024-02-16

## 2024-02-16 RX ORDER — FERROUS SULFATE 325(65) MG
325 TABLET ORAL 2 TIMES DAILY WITH MEALS
Status: DISCONTINUED | OUTPATIENT
Start: 2024-02-17 | End: 2024-02-18 | Stop reason: HOSPADM

## 2024-02-16 RX ORDER — SODIUM CHLORIDE, SODIUM LACTATE, POTASSIUM CHLORIDE, AND CALCIUM CHLORIDE .6; .31; .03; .02 G/100ML; G/100ML; G/100ML; G/100ML
500 INJECTION, SOLUTION INTRAVENOUS PRN
Status: DISCONTINUED | OUTPATIENT
Start: 2024-02-16 | End: 2024-02-16

## 2024-02-16 RX ORDER — ACETAMINOPHEN 500 MG
1000 TABLET ORAL EVERY 8 HOURS PRN
Status: DISCONTINUED | OUTPATIENT
Start: 2024-02-16 | End: 2024-02-18 | Stop reason: HOSPADM

## 2024-02-16 RX ORDER — SODIUM CHLORIDE 9 MG/ML
25 INJECTION, SOLUTION INTRAVENOUS PRN
Status: DISCONTINUED | OUTPATIENT
Start: 2024-02-16 | End: 2024-02-16

## 2024-02-16 RX ADMIN — Medication 909 MILLI-UNITS/MIN: at 16:55

## 2024-02-16 RX ADMIN — SODIUM CHLORIDE, POTASSIUM CHLORIDE, SODIUM LACTATE AND CALCIUM CHLORIDE: 600; 310; 30; 20 INJECTION, SOLUTION INTRAVENOUS at 10:55

## 2024-02-16 RX ADMIN — ONDANSETRON 4 MG: 2 INJECTION INTRAMUSCULAR; INTRAVENOUS at 16:23

## 2024-02-16 RX ADMIN — DOCUSATE SODIUM 100 MG: 100 CAPSULE, LIQUID FILLED ORAL at 21:13

## 2024-02-16 RX ADMIN — Medication 87.3 MILLI-UNITS/MIN: at 17:06

## 2024-02-16 ASSESSMENT — PAIN SCALES - GENERAL: PAINLEVEL_OUTOF10: 0

## 2024-02-16 NOTE — PROGRESS NOTES
38W 2D   EDC 2024  Ambulatory in with verbal c/o contractions every 6 minutes and light vaginal bleeding. Patient states she does not want induction of labor with medication and refuses an epidural. Patient does not want infant to receive Hepatitis B vaccine or vitamin K, this RN provided her with education and gave paperwork for further education.   Meternal perception of fetal movement noted. Pt denies leaking of fluids. Call light within reach, ice water provided to pt.     1011 This RN called and update Dr Farnsworth with above information, FHM, contractions, vitals and SVE

## 2024-02-16 NOTE — PROGRESS NOTES
Dr Farnsworth at bedside SVE check and M assessed.   This RN update Dr Farnsworth on Abnormal GBS on 1- and on 2-7-2024 GBS not detected lab. This RN wanted to clarify no treatment for abnormal GBS. Dr stated no treatment/ no new orders

## 2024-02-16 NOTE — L&D DELIVERY NOTE
Department of Obstetrics and Gynecology  Spontaneous Vaginal Delivery Note         Pre-operative Diagnosis:  Term pregnancy, Spontaneous labor, Single fetus, and Uncomplicated pregnancy    Post-operative Diagnosis:  Same + live male wt 6 #,11 oz  Procedure:  Spontaneous vaginal delivery    Surgeon:  Hugh Farnsworth MD    Anesthesia:  none    Estimated blood loss:  200    Specimen:  Placenta sent to pathology     Cord blood sent Yes    Complications:  none    Condition:  infant stable to general nursery and mother stable    Details of Procedure:   The patient is a 23 y.o. female at 38w2d   OB History          3    Para   2    Term   2            AB        Living   1         SAB        IAB        Ectopic        Molar        Multiple        Live Births   1             who was admitted for active phase labor. She received the following interventions: none She was known to be GBS negative and did not receive antibiotic prophylaxis. The patient progressed well,did not receive an epidural, became complete and started to push. After pushing for 3 x the fetal head was at the perineum, nose and mouth suctioned with bulb suction and the rest of the infant delivered atraumatically, placed on mother abdomen.Cord was clamped and cut and infant handed off to the waiting nurse for evaluation. The delivery of the placenta was spontaneous. The perineum and vagina were explored and no tears or lacerations were noted.

## 2024-02-16 NOTE — CARE COORDINATION
2024: SS NOTE:  SS Consult noted  regarding \"+ UDS\", per chart review, pt admitted for IOL, UDS positive for Cannabinoid on admission, sw will follow with pt after delivery to complete ANDRES- mandated  guide for plan of safe care assessment and for UDS results on  to review with [facility] as per protocol, Nursing informed.Electronically signed by TONIO Angeles on 2024 at 1:46 PM

## 2024-02-16 NOTE — H&P
Subjective:      Cassia Keene is an 23 y.o. female at 38 and 2/7 weeks gestation  complaining of contractions every 3 minutes lasting 30 seconds. Other associated symptoms include low back pain. Fetal Movement: normal.  Patient's medications, allergies, past medical, surgical, social and family histories were reviewed and updated as appropriate.  Social History:  TOBACCO:   reports that she has never smoked. She has never used smokeless tobacco.  ETOH:   has no history on file for alcohol use.  DRUGS:   has no history on file for drug use.    Family History:   No family history on file.    meds:  Current Facility-Administered Medications:     lactated ringers IV soln infusion, , IntraVENous, Continuous, Hugh Farnsworth MD, Last Rate: 125 mL/hr at 02/16/24 1055, New Bag at 02/16/24 1055    lactated ringers bolus bolus 500 mL, 500 mL, IntraVENous, PRN **OR** lactated ringers bolus bolus 1,000 mL, 1,000 mL, IntraVENous, PRN, Hugh Farnsworth MD    sodium chloride flush 0.9 % injection 5-40 mL, 5-40 mL, IntraVENous, 2 times per day, Hugh Farnsworth MD    sodium chloride flush 0.9 % injection 5-40 mL, 5-40 mL, IntraVENous, PRN, Hugh Farnsworth MD    0.9 % sodium chloride infusion, 25 mL, IntraVENous, PRN, Hugh Farnsworth MD    methylergonovine (METHERGINE) injection 200 mcg, 200 mcg, IntraMUSCular, PRN, Hugh Farnsworth MD    carboprost (HEMABATE) injection 250 mcg, 250 mcg, IntraMUSCular, PRN, Hugh Farnsworth MD    miSOPROStol (CYTOTEC) tablet 800 mcg, 800 mcg, Rectal, PRN, Hugh Farnsworth MD    tranexamic acid (CYKLOKAPRON) 1,000 mg in sodium chloride 0.9 % 100 mL IVPB, 1,000 mg, IntraVENous, Once PRN, Hugh Farnsworth MD    oxytocin (PITOCIN) 30 units in 500 mL infusion, 87.3 talya-units/min, IntraVENous, Continuous PRN **AND** oxytocin (PITOCIN) 30 units in 500 mL infusion, 909 talya-units/min, IntraVENous, PRN, Hugh Farnsworth MD    ondansetron (ZOFRAN) injection 4 mg, 4 mg,

## 2024-02-17 PROBLEM — O26.90 COMPLICATED PREGNANCY: Status: ACTIVE | Noted: 2024-02-17

## 2024-02-17 LAB
HCT VFR BLD AUTO: 35.5 % (ref 34–48)
HGB BLD-MCNC: 11.4 G/DL (ref 11.5–15.5)

## 2024-02-17 PROCEDURE — 1220000001 HC SEMI PRIVATE L&D R&B

## 2024-02-17 PROCEDURE — 85014 HEMATOCRIT: CPT

## 2024-02-17 PROCEDURE — 6370000000 HC RX 637 (ALT 250 FOR IP): Performed by: OBSTETRICS & GYNECOLOGY

## 2024-02-17 PROCEDURE — 85018 HEMOGLOBIN: CPT

## 2024-02-17 RX ORDER — IBUPROFEN 800 MG/1
800 TABLET ORAL EVERY 8 HOURS PRN
Qty: 120 TABLET | Refills: 0 | Status: SHIPPED | OUTPATIENT
Start: 2024-02-17

## 2024-02-17 RX ADMIN — DOCUSATE SODIUM 100 MG: 100 CAPSULE, LIQUID FILLED ORAL at 09:01

## 2024-02-17 RX ADMIN — WITCH HAZEL: 500 SOLUTION RECTAL; TOPICAL at 08:47

## 2024-02-17 RX ADMIN — Medication: at 08:47

## 2024-02-17 RX ADMIN — DOCUSATE SODIUM 100 MG: 100 CAPSULE, LIQUID FILLED ORAL at 20:33

## 2024-02-17 ASSESSMENT — PAIN SCALES - GENERAL
PAINLEVEL_OUTOF10: 0

## 2024-02-17 NOTE — PROGRESS NOTES
[employer] [place] Hearing screening results were discussed with parent. Questions answered. Brochure given to parent. Advised to monitor developmental milestones and contact physician for any concerns.   Nestor Herbert, AuD

## 2024-02-17 NOTE — DISCHARGE INSTRUCTIONS
Follow up with Dr Farnsworth in the office in 6 weeks.     No sexual intercourse, tampon use, douching, tub baths, or swimming for 6 weeks until the healing process has completed and you have been cleared by Dr Farnsworth     No heavy lifting or strenuous activity for 6 weeks.     No driving for 2 weeks.       After Your Delivery (the Postpartum Period): Care Instructions  Overview    Congratulations on the birth of your baby. Like pregnancy, the  period can be a time of excitement, corina, and exhaustion. You may look at your wondrous little baby and feel happy. You may also be overwhelmed by your new sleep hours and new responsibilities.  At first, babies often sleep during the days and are awake at night. They do not have a pattern or routine. They may make sudden gasps, jerk themselves awake, or look like they have crossed eyes. These are all normal, and they may even make you smile.  In these first weeks after delivery, try to take good care of yourself. It may take 4 to 6 weeks to feel like yourself again, and possibly longer if you had a  birth. You will likely feel very tired for several weeks. Your days will be full of ups and downs, but lots of corina as well.  Follow-up care is a key part of your treatment and safety. Be sure to make and go to all appointments, and call your doctor if you are having problems. It's also a good idea to know your test results and keep a list of the medicines you take.  How can you care for yourself at home?  Take care of your body after delivery  Use pads instead of tampons for the bloody flow that may last as long as 2 weeks.  Ease cramps with ibuprofen (Advil, Motrin).  Ease soreness of hemorrhoids and the area between your vagina and rectum with ice compresses or witch hazel pads.  Ease constipation by drinking lots of fluid and eating high-fiber foods. Ask your doctor about over-the-counter stool softeners.  Cleanse yourself with a gentle squeeze of warm water

## 2024-02-17 NOTE — CARE COORDINATION
2024: SS NOTE:  SS Consult noted regarding pt and  having + UDS for Cannabinoid, Met with pt, Cassia, her best friend, Sonia remained present at her bedside for consult with her permission, she was currently holding her  son, Vasquez Keene and was loving and affectionate toward her baby during sw visit and no other parenting concerns reported by nursing staff, no mental health or PPD concerns noted or relayed. Cassia admits to marijuana use about 3 weeks ago to help with nausea, states she \"could not stop throwing up\", she denies continued drug use, drug abuse or need for addiction counseling referral or resources. She reports recently moving to [facility] 7-8 months ago from [place], denies any history or open case with [facility], she reports now living on her own at address- [address] [place] [address], [id number] with her 2 daughters, 5y & 3y who are currently being cared for by her mother, Alicia Keene while she is hospitalized and who she list as her support person for plan of care, phone numbers in chart record correct. She reports having all the necessary supports and provisions to safely care for her  at home and already has a WIC appointment scheduled for next Tuesday.  ANDRES assessment completed and report information called to toni Viramontes \"on call\" pre-screener for [facility], she will review with agency supervisor but anticipate case will be assigned for ongoing services and assigned cw will contact Cassia to arrange a home visit but there is NO HOLD ON  & NO SAFETY PLAN NEEDED prior to baby's release, Cassia & Nursing informed. Electronically signed by TONIO Angeles on 2024 at 11:02 AM

## 2024-02-17 NOTE — PLAN OF CARE
Problem: Safety - Adult  Goal: Free from fall injury  2024 1353 by Cary Saul RN  Outcome: Completed  2024 0850 by Cary Saul RN  Outcome: Progressing     Problem: Vaginal Birth or  Section  Goal: Fetal and maternal status remain reassuring during the birth process  Description:  Birth OB-Pregnancy care plan goal which identifies if the fetal and maternal status remain reassuring during the birth process  Outcome: Completed     Problem: Postpartum  Goal: Experiences normal postpartum course  Description:  Postpartum OB-Pregnancy care plan goal which identifies if the mother is experiencing a normal postpartum course  2024 1353 by Cary Saul RN  Outcome: Completed  2024 0850 by Cary Saul RN  Outcome: Progressing  Goal: Appropriate maternal -  bonding  Description:  Postpartum OB-Pregnancy care plan goal which identifies if the mother and  are bonding appropriately  Outcome: Completed  Goal: Establishment of infant feeding pattern  Description:  Postpartum OB-Pregnancy care plan goal which identifies if the mother is establishing a feeding pattern with their   Outcome: Completed  Goal: Incisions, wounds, or drain sites healing without S/S of infection  Outcome: Completed     Problem: Pain  Goal: Verbalizes/displays adequate comfort level or baseline comfort level  Outcome: Completed     Problem: Infection - Adult  Goal: Absence of infection at discharge  Outcome: Completed  Goal: Absence of infection during hospitalization  Outcome: Completed  Goal: Absence of fever/infection during anticipated neutropenic period  Outcome: Completed     Problem: Discharge Planning  Goal: Discharge to home or other facility with appropriate resources  Outcome: Completed     Problem: Chronic Conditions and Co-morbidities  Goal: Patient's chronic conditions and co-morbidity symptoms are monitored and maintained or improved  Outcome: Completed

## 2024-02-17 NOTE — DISCHARGE SUMMARY
Obstetrical Discharge Form         Patients Name  Cassia Keene    Gestational Age:  38w2d    Antepartum complications: none    Date of Delivery:   2024       4:55 PM      Type of Delivery:   Vaginal, Spontaneous [250]   Rupture Date/time:    2024      3:19 PM     Presentation:    Vertex [1]   Position:                      Anesthesia:         Feeding method:         Delivered By:   SYED BERNABE     Baby:       Information for the patient's :  Andrei Keene [[id number]]        Intrapartum complications: None  Postpartum complications: none  Discharge Date:   24  Discharge Condition: Stable  Disposition:   Home    Plan:   Follow up    in 6 weeks

## 2024-02-17 NOTE — PLAN OF CARE
Problem: Safety - Adult  Goal: Free from fall injury  Outcome: Progressing     Problem: Postpartum  Goal: Experiences normal postpartum course  Description:  Postpartum OB-Pregnancy care plan goal which identifies if the mother is experiencing a normal postpartum course  Outcome: Progressing

## 2024-02-18 VITALS
OXYGEN SATURATION: 98 % | RESPIRATION RATE: 14 BRPM | HEART RATE: 91 BPM | SYSTOLIC BLOOD PRESSURE: 101 MMHG | DIASTOLIC BLOOD PRESSURE: 66 MMHG | TEMPERATURE: 98 F

## 2024-02-18 PROCEDURE — 7200000001 HC VAGINAL DELIVERY

## 2024-02-18 PROCEDURE — 6370000000 HC RX 637 (ALT 250 FOR IP): Performed by: OBSTETRICS & GYNECOLOGY

## 2024-02-18 RX ADMIN — Medication: at 09:43

## 2024-02-18 NOTE — PROGRESS NOTES
Department of Obstetrics and Gynecology  Labor and Delivery  Attending Post Partum Progress Note      SUBJECTIVE:  Doing well with no complaints  OBJECTIVE:      Vitals:  /66   Pulse 91   Temp 98 °F (36.7 °C) (Oral)   Resp 14   LMP 05/24/2023   SpO2 98%   Breastfeeding Unknown     ABDOMEN:  Soft, well contracted uterus   Lochia: Normal  No calf tenderness    DATA:    CBC:    Lab Results   Component Value Date/Time    WBC 9.6 02/16/2024 10:55 AM    RBC 4.12 02/16/2024 10:55 AM    HGB 11.4 02/17/2024 04:45 AM    HCT 35.5 02/17/2024 04:45 AM    MCV 87.1 02/16/2024 10:55 AM    RDW 13.3 02/16/2024 10:55 AM     02/16/2024 10:55 AM       ASSESSMENT & PLAN:      PPD # 1    Continue current care.  Discharge home tomorrow.

## 2024-02-18 NOTE — PROGRESS NOTES
Patient Grady paperwork is signed and in patients chart. Patient states understanding and has no questions at this time.

## 2024-02-18 NOTE — PROGRESS NOTES
This RN educated patient on TDAP vaccine. Patient states she did not receive during this pregnancy and does not want to receive before discharge.

## 2024-02-19 LAB
ABNORMAL SPECIMEN VALIDITY TEST: NORMAL
INTEGRITY CHECK, CREATININE, URINE: 45.9 MG/DL (ref 22–250)
INTEGRITY CHECK, OXIDANT, URINE: <40 MG/L
INTEGRITY CHECK, PH, URINE: 7.5 (ref 4.5–9)
INTEGRITY CHECK, SPECIFIC GRAVITY, URINE: 1.01 (ref 1–1.03)
RPR SER QL: NONREACTIVE

## 2024-02-20 LAB
CANNABIDIOL, QUANTITATIVE, URINE: 231.7 NG/ML
COMPLIANCE DRUG ANALYSIS, URINE: NORMAL
SPECIMEN DESCRIPTION: ABNORMAL
SPECIMEN DESCRIPTION: NORMAL
THC NORMALIZED, QUANTITIATIVE, URINE: ABNORMAL NG/ML
THC-COOH, QUANTITATIVE, URINE: >1000 NG/ML

## 2025-07-11 ENCOUNTER — HOSPITAL ENCOUNTER (EMERGENCY)
Age: 25
Discharge: HOME | End: 2025-07-11
Attending: EMERGENCY MEDICINE
Payer: MEDICAID

## 2025-07-11 ENCOUNTER — APPOINTMENT (OUTPATIENT)
Dept: GENERAL RADIOLOGY | Age: 25
End: 2025-07-11
Payer: MEDICAID

## 2025-07-11 VITALS
SYSTOLIC BLOOD PRESSURE: 127 MMHG | HEIGHT: 60 IN | DIASTOLIC BLOOD PRESSURE: 71 MMHG | WEIGHT: 135 LBS | TEMPERATURE: 98.2 F | BODY MASS INDEX: 26.5 KG/M2 | OXYGEN SATURATION: 97 % | HEART RATE: 115 BPM
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
## 2025-07-11 PROCEDURE — 90714 TD VACC NO PRESV 7 YRS+ IM: CPT | Performed by: EMERGENCY MEDICINE

## 2025-07-11 PROCEDURE — 99283 EMERGENCY DEPT VISIT LOW MDM: CPT

## 2025-07-11 PROCEDURE — 6360000002 HC RX W HCPCS: Performed by: EMERGENCY MEDICINE

## 2025-07-11 PROCEDURE — 90471 IMMUNIZATION ADMIN: CPT | Performed by: EMERGENCY MEDICINE

## 2025-07-11 PROCEDURE — 6370000000 HC RX 637 (ALT 250 FOR IP): Performed by: EMERGENCY MEDICINE

## 2025-07-11 PROCEDURE — 73130 X-RAY EXAM OF HAND: CPT

## 2025-07-11 PROCEDURE — 2580000003 HC RX 258

## 2025-07-11 RX ORDER — 0.9 % SODIUM CHLORIDE 0.9 %
1000 INTRAVENOUS SOLUTION INTRAVENOUS ONCE
Status: COMPLETED | OUTPATIENT
Start: 2025-07-11 | End: 2025-07-11

## 2025-07-11 RX ADMIN — AMOXICILLIN AND CLAVULANATE POTASSIUM 1 TABLET: 875; 125 TABLET, FILM COATED ORAL at 06:34

## 2025-07-11 RX ADMIN — SODIUM CHLORIDE 1000 ML: 0.9 INJECTION, SOLUTION INTRAVENOUS at 06:33

## 2025-07-11 RX ADMIN — CLOSTRIDIUM TETANI TOXOID ANTIGEN (FORMALDEHYDE INACTIVATED) AND CORYNEBACTERIUM DIPHTHERIAE TOXOID ANTIGEN (FORMALDEHYDE INACTIVATED) 0.5 ML: 5; 2 INJECTION, SUSPENSION INTRAMUSCULAR at 06:34

## 2025-07-11 ASSESSMENT — LIFESTYLE VARIABLES
HOW MANY STANDARD DRINKS CONTAINING ALCOHOL DO YOU HAVE ON A TYPICAL DAY: PATIENT DOES NOT DRINK
HOW OFTEN DO YOU HAVE A DRINK CONTAINING ALCOHOL: NEVER

## 2025-07-11 NOTE — ED PROVIDER NOTES
[facility] EMERGENCY DEPARTMENT  EMERGENCY DEPARTMENT ENCOUNTER        Pt Name: Cassia Keene  MRN: [id number]  Birthdate 2000  Date of evaluation: 7/11/2025  Provider: Pietro Roberts DO  PCP: No primary care provider on file.  Note Started: 6:18 AM EDT 7/11/25    CHIEF COMPLAINT       Chief Complaint   Patient presents with    Animal Bite     Pt tried to pet her friend's pig and it bit her left hand, leaving two small punctures and visible swelling; ice pack applied in triage.       HISTORY OF PRESENT ILLNESS: 1 or more Elements   History From: patient    Limitations to history : None    Cassia Keene is a 24 y.o. female who presents to the ED for evaluation of a pig bite.  Patient states that she was trying to feed jellybeans to her friend's pig.  She states that the pig got excited and knocked the enclosure over and bit her hand because she was holding the jellybeans.  They report that the pig's behavior has been normal.  Patient was bit on the left hand.  Patient is right-hand dominant.  Tetanus status is is not up to date.  She has noted swelling of the hand which was worse and is getting better.    Nursing Notes were all reviewed and agreed with or any disagreements were addressed in the HPI.      REVIEW OF EXTERNAL NOTE :        REVIEW OF SYSTEMS :           Positives and Pertinent negatives as per HPI.     SURGICAL HISTORY     Past Surgical History:   Procedure Laterality Date    BACK SURGERY         CURRENTMEDICATIONS       Previous Medications    IBUPROFEN (ADVIL;MOTRIN) 800 MG TABLET    Take 1 tablet by mouth every 8 hours as needed for Pain    PRENATAL MV-MIN-FE FUM-FA-DHA (PRENATAL 1 PO)    Take by mouth       ALLERGIES     Patient has no known allergies.    FAMILYHISTORY     No family history on file.     SOCIAL HISTORY       Social History     Tobacco Use    Smoking status: Never    Smokeless tobacco: Never       SCREENINGS        Saul Coma Scale  Eye Opening: